# Patient Record
Sex: FEMALE | NOT HISPANIC OR LATINO | URBAN - METROPOLITAN AREA
[De-identification: names, ages, dates, MRNs, and addresses within clinical notes are randomized per-mention and may not be internally consistent; named-entity substitution may affect disease eponyms.]

---

## 2017-01-30 ENCOUNTER — IMPORTED ENCOUNTER (OUTPATIENT)
Dept: URBAN - METROPOLITAN AREA CLINIC 1 | Facility: CLINIC | Age: 57
End: 2017-01-30

## 2017-01-30 PROBLEM — H40.013: Noted: 2017-01-30

## 2017-01-30 PROBLEM — H52.4: Noted: 2017-01-30

## 2017-01-30 PROCEDURE — S0620 ROUTINE OPHTHALMOLOGICAL EXA: HCPCS

## 2017-01-30 NOTE — PATIENT DISCUSSION
1.  Presbyopia: Rx was given for corrective spectacles if indicated. 2.  COAG suspect OU: (0.35/0.55)  IOP was 19 OU. Fm HX. Condition was discussed with patient. Will monitor patient for progression. Patient was asked to return to office in 1 month for 30/OCT/VF 24-23. Return for an appointment in 1 month for 30 OCT and VF 24-2 with Dr. Feliciano Britt.

## 2017-03-03 ENCOUNTER — IMPORTED ENCOUNTER (OUTPATIENT)
Dept: URBAN - METROPOLITAN AREA CLINIC 1 | Facility: CLINIC | Age: 57
End: 2017-03-03

## 2017-03-03 PROBLEM — H25.13: Noted: 2017-03-03

## 2017-03-03 PROBLEM — H40.013: Noted: 2017-03-03

## 2017-03-03 PROCEDURE — 92014 COMPRE OPH EXAM EST PT 1/>: CPT

## 2017-03-03 PROCEDURE — 92133 CPTRZD OPH DX IMG PST SGM ON: CPT

## 2017-03-03 PROCEDURE — 92083 EXTENDED VISUAL FIELD XM: CPT

## 2017-03-03 NOTE — PATIENT DISCUSSION
1. COAG Suspect OU: (0.35/0.60)  IOP was 19 OU.  -Fm HX. Condition was discussed with patient. Will monitor patient for progression. OCT/VF was ordered and done today results was normal 2. Cataract OU: Observe for now without intervention. The patient was advised to contact us if any change or worsening of vision3. Return for an appointment in 1 year for 30 and OCT. with Dr. Dolores Brownlee.

## 2019-04-09 ENCOUNTER — IMPORTED ENCOUNTER (OUTPATIENT)
Dept: URBAN - METROPOLITAN AREA CLINIC 1 | Facility: CLINIC | Age: 59
End: 2019-04-09

## 2019-04-09 PROBLEM — H02.834: Noted: 2019-04-09

## 2019-04-09 PROBLEM — H40.013: Noted: 2019-04-09

## 2019-04-09 PROBLEM — H02.831: Noted: 2019-04-09

## 2019-04-09 PROBLEM — H25.13: Noted: 2019-04-09

## 2019-04-09 PROCEDURE — 92083 EXTENDED VISUAL FIELD XM: CPT

## 2019-04-09 PROCEDURE — 92133 CPTRZD OPH DX IMG PST SGM ON: CPT

## 2019-04-09 PROCEDURE — 92015 DETERMINE REFRACTIVE STATE: CPT

## 2019-04-09 PROCEDURE — 92014 COMPRE OPH EXAM EST PT 1/>: CPT

## 2019-04-09 NOTE — PATIENT DISCUSSION
1. COAG Suspect OU: (0.40/0.60)  IOP was 18/19.  -Fm HX. Condition was discussed with patient. Will monitor patient for progression. OCT was normal today. VF was normal showing superior defect secondary to lids OU 2. Cataract OU: Observe for now without intervention. The patient was advised to contact us if any change or worsening of vision3. Dermatochalasis OU UL's  - Follow with no intervention at this time. 4. Return for an appointment in 1 year for 30 and OCT. with Dr. Brianna Lizarraga.

## 2021-06-02 ENCOUNTER — IMPORTED ENCOUNTER (OUTPATIENT)
Dept: URBAN - METROPOLITAN AREA CLINIC 1 | Facility: CLINIC | Age: 61
End: 2021-06-02

## 2021-06-02 PROBLEM — H02.834: Noted: 2021-06-02

## 2021-06-02 PROBLEM — H40.013: Noted: 2021-06-02

## 2021-06-02 PROBLEM — H25.13: Noted: 2021-06-02

## 2021-06-02 PROCEDURE — 99214 OFFICE O/P EST MOD 30 MIN: CPT

## 2021-06-02 PROCEDURE — 92133 CPTRZD OPH DX IMG PST SGM ON: CPT

## 2021-06-02 NOTE — PATIENT DISCUSSION
Dermatochalasis VY - Discussed option of referral to lid specialist for further evaluation and/or treatment. Pt wishes to hold off at this time. Follow with no intervention at this time.

## 2021-06-02 NOTE — PATIENT DISCUSSION
1.  Glaucoma Suspect OU (CD 0.30/0.50): OCT WNL OU today. IOP 17 OU. (-) Family Hx. Patient is considered Low Risk. Condition was discussed with patient and patient understands. Will continue to monitor patient for any progression in condition. Patient was advised to call us with any problems questions or concerns. 2.  Cataract OU: Observe for now without intervention. The patient was advised to contact us if any change or worsening of vision3. Dermatochalasis VY - Discussed option of referral to lid specialist for further evaluation and/or treatment. Pt wishes to hold off at this time. Follow with no intervention at this time. Recommend Pataday Qdaily PRN for puffyness of lids (coupon given). Patient deferred Manifest Rx today. Return for an appointment in 1 year 30/OCT with Dr. Ddii Riley.

## 2022-04-02 ASSESSMENT — TONOMETRY
OS_IOP_MMHG: 19
OD_IOP_MMHG: 18
OS_IOP_MMHG: 19
OD_IOP_MMHG: 19
OS_IOP_MMHG: 17
OS_IOP_MMHG: 19
OD_IOP_MMHG: 17
OD_IOP_MMHG: 19

## 2022-04-02 ASSESSMENT — VISUAL ACUITY
OD_SC: 20/20
OS_SC: 20/20
OD_SC: 20/20
OD_SC: 20/20
OS_SC: 20/20
OD_SC: J5
OS_SC: 20/20
OS_SC: 20/20
OS_SC: J5
OD_SC: 20/20

## 2022-06-07 NOTE — PATIENT DISCUSSION
(CD 0.30/0.50) - Stable IOP 18 OU. OCT remains WNL OU. (-) Family Hx. Will perform OCT every other year. Condition was discussed with patient and patient understands. Will continue to monitor patient for any progression in condition. Patient was advised to call us with any problems questions or concerns.